# Patient Record
Sex: FEMALE | Race: BLACK OR AFRICAN AMERICAN | NOT HISPANIC OR LATINO | URBAN - METROPOLITAN AREA
[De-identification: names, ages, dates, MRNs, and addresses within clinical notes are randomized per-mention and may not be internally consistent; named-entity substitution may affect disease eponyms.]

---
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
## 2023-04-20 ENCOUNTER — LAB VISIT (OUTPATIENT)
Dept: LAB | Facility: HOSPITAL | Age: 18
End: 2023-04-20
Payer: COMMERCIAL

## 2023-04-20 ENCOUNTER — OFFICE VISIT (OUTPATIENT)
Dept: INTERNAL MEDICINE | Facility: CLINIC | Age: 18
End: 2023-04-20
Payer: COMMERCIAL

## 2023-04-20 VITALS
WEIGHT: 167.75 LBS | HEIGHT: 63 IN | TEMPERATURE: 98 F | SYSTOLIC BLOOD PRESSURE: 116 MMHG | BODY MASS INDEX: 29.72 KG/M2 | OXYGEN SATURATION: 97 % | DIASTOLIC BLOOD PRESSURE: 66 MMHG | HEART RATE: 84 BPM

## 2023-04-20 DIAGNOSIS — Z11.3 SCREENING FOR STD (SEXUALLY TRANSMITTED DISEASE): ICD-10-CM

## 2023-04-20 DIAGNOSIS — Z88.9 MULTIPLE ALLERGIES: ICD-10-CM

## 2023-04-20 DIAGNOSIS — R59.1 LYMPHADENOPATHY OF HEAD AND NECK: ICD-10-CM

## 2023-04-20 DIAGNOSIS — Z79.899 LONG TERM USE OF DRUG: Primary | ICD-10-CM

## 2023-04-20 PROCEDURE — 99204 OFFICE O/P NEW MOD 45 MIN: CPT | Mod: S$GLB,,, | Performed by: FAMILY MEDICINE

## 2023-04-20 PROCEDURE — 3074F PR MOST RECENT SYSTOLIC BLOOD PRESSURE < 130 MM HG: ICD-10-PCS | Mod: CPTII,S$GLB,, | Performed by: FAMILY MEDICINE

## 2023-04-20 PROCEDURE — 99204 PR OFFICE/OUTPT VISIT, NEW, LEVL IV, 45-59 MIN: ICD-10-PCS | Mod: S$GLB,,, | Performed by: FAMILY MEDICINE

## 2023-04-20 PROCEDURE — 99999 PR PBB SHADOW E&M-EST. PATIENT-LVL IV: ICD-10-PCS | Mod: PBBFAC,,, | Performed by: FAMILY MEDICINE

## 2023-04-20 PROCEDURE — 3008F BODY MASS INDEX DOCD: CPT | Mod: CPTII,S$GLB,, | Performed by: FAMILY MEDICINE

## 2023-04-20 PROCEDURE — 3078F PR MOST RECENT DIASTOLIC BLOOD PRESSURE < 80 MM HG: ICD-10-PCS | Mod: CPTII,S$GLB,, | Performed by: FAMILY MEDICINE

## 2023-04-20 PROCEDURE — 3008F PR BODY MASS INDEX (BMI) DOCUMENTED: ICD-10-PCS | Mod: CPTII,S$GLB,, | Performed by: FAMILY MEDICINE

## 2023-04-20 PROCEDURE — 1159F PR MEDICATION LIST DOCUMENTED IN MEDICAL RECORD: ICD-10-PCS | Mod: CPTII,S$GLB,, | Performed by: FAMILY MEDICINE

## 2023-04-20 PROCEDURE — 3044F HG A1C LEVEL LT 7.0%: CPT | Mod: CPTII,S$GLB,, | Performed by: FAMILY MEDICINE

## 2023-04-20 PROCEDURE — 3078F DIAST BP <80 MM HG: CPT | Mod: CPTII,S$GLB,, | Performed by: FAMILY MEDICINE

## 2023-04-20 PROCEDURE — 3044F PR MOST RECENT HEMOGLOBIN A1C LEVEL <7.0%: ICD-10-PCS | Mod: CPTII,S$GLB,, | Performed by: FAMILY MEDICINE

## 2023-04-20 PROCEDURE — 87591 N.GONORRHOEAE DNA AMP PROB: CPT | Performed by: FAMILY MEDICINE

## 2023-04-20 PROCEDURE — 1159F MED LIST DOCD IN RCRD: CPT | Mod: CPTII,S$GLB,, | Performed by: FAMILY MEDICINE

## 2023-04-20 PROCEDURE — 3074F SYST BP LT 130 MM HG: CPT | Mod: CPTII,S$GLB,, | Performed by: FAMILY MEDICINE

## 2023-04-20 PROCEDURE — 99999 PR PBB SHADOW E&M-EST. PATIENT-LVL IV: CPT | Mod: PBBFAC,,, | Performed by: FAMILY MEDICINE

## 2023-04-20 RX ORDER — EPINEPHRINE 0.3 MG/.3ML
2 INJECTION SUBCUTANEOUS ONCE
Qty: 0.6 ML | Refills: 0 | Status: SHIPPED | OUTPATIENT
Start: 2023-04-20 | End: 2023-05-04

## 2023-04-20 NOTE — PROGRESS NOTES
Subjective:      Patient ID: Anita Romero is a 17 y.o. female.    Chief Complaint: Establish Care    Originally from [place]. Needs a local PCP.   [place]. Wants to do business. Wants to real estate.     Had rash on her ears and neck.  She was around a tree. She wants to be tested for allergies.    She often breaks out and doesn't know from what.   Will have throat swelling with shellfish.   Has to carry benadryl around with her.   Does not have any epi pen.     Noticed a swelling behind her ear. First noticed it yesterday.    No pain.   No change in ROM with swelling.     Interested in being screened for STDs.       The patient's Health Maintenance was reviewed and the following appears to be due at this time:   Health Maintenance Due   Topic Date Due    COVID-19 Vaccine (1) Never done    TETANUS VACCINE  Never done    Influenza Vaccine (1) Never done        Past Medical History:  History reviewed. No pertinent past medical history.  History reviewed. No pertinent surgical history.  Review of patient's allergies indicates:   Allergen Reactions    Shellfish containing products Swelling     Social History     Socioeconomic History    Marital status: Single   Occupational History    Occupation: student   Tobacco Use    Smoking status: Never    Smokeless tobacco: Never   Substance and Sexual Activity    Alcohol use: Never    Drug use: Never    Sexual activity: Yes     Partners: Male     Birth control/protection: OCP     Family History   Problem Relation Age of Onset    Hypertension Mother        Review of Systems   Constitutional:  Negative for activity change and unexpected weight change.   HENT:  Negative for hearing loss, rhinorrhea and trouble swallowing.    Eyes:  Negative for discharge and visual disturbance.   Respiratory:  Negative for chest tightness and wheezing.    Cardiovascular:  Negative for chest pain and palpitations.   Gastrointestinal:  Negative for blood in stool, constipation, diarrhea and  "vomiting.   Endocrine: Negative for polydipsia and polyuria.   Genitourinary:  Negative for difficulty urinating, dysuria, hematuria and menstrual problem.   Musculoskeletal:  Negative for arthralgias, joint swelling and neck pain.   Neurological:  Negative for weakness and headaches.   Psychiatric/Behavioral:  Negative for confusion and dysphoric mood.       Objective:   /66 (BP Location: Left arm, Patient Position: Sitting, BP Method: Medium (Manual))   Pulse 84   Temp 97.6 °F (36.4 °C) (Tympanic)   Ht 5' 2.6" (1.59 m)   Wt 76.1 kg (167 lb 12.3 oz)   SpO2 97%   BMI 30.10 kg/m²     Physical Exam  Vitals and nursing note reviewed.   Constitutional:       Appearance: Normal appearance.   HENT:      Head: Normocephalic.   Eyes:      Conjunctiva/sclera: Conjunctivae normal.   Cardiovascular:      Rate and Rhythm: Normal rate and regular rhythm.   Pulmonary:      Effort: Pulmonary effort is normal.      Breath sounds: Normal breath sounds.   Musculoskeletal:      Right lower leg: No edema.      Left lower leg: No edema.   Lymphadenopathy:      Cervical: Cervical adenopathy (Postauricular lymphadenopathy on left side) present.   Skin:     General: Skin is warm and dry.      Findings: No rash.   Neurological:      Mental Status: She is alert and oriented to person, place, and time. Mental status is at baseline.   Psychiatric:         Mood and Affect: Mood normal.         Behavior: Behavior normal.         Thought Content: Thought content normal.       1. Long term use of drug    2. Multiple allergies    3. Screening for STD (sexually transmitted disease)    4. Lymphadenopathy of head and neck      Plan:       1. Long term use of drug  -     CBC Auto Differential; Future; Expected date: 04/20/2023  -     Comprehensive Metabolic Panel; Future; Expected date: 04/20/2023  -     TSH; Future; Expected date: 04/20/2023  -     T4, Free; Future; Expected date: 04/20/2023  -     Hemoglobin A1C; Future; Expected date: " 04/20/2023  -     Lipid Panel; Future; Expected date: 04/20/2023    2. Multiple allergies  Comments:  Referral to Allergy for testing; recommended patient carrying EpiPen and avoid seafood until she establishes with allergy  Orders:  -     Ambulatory referral/consult to Allergy; Future; Expected date: 04/27/2023  -     EPINEPHrine (EPIPEN) 0.3 mg/0.3 mL AtIn; Inject 0.6 mLs (0.6 mg total) into the muscle once. for 1 dose  Dispense: 0.6 mL; Refill: 0    3. Screening for STD (sexually transmitted disease)  Comments:  Checking labs, we will follow up with the results  Orders:  -     Hepatitis B Surface Ab, Qualitative; Future; Expected date: 04/20/2023  -     Hepatitis B Surface Antigen; Future; Expected date: 04/20/2023  -     Hepatitis C Antibody; Future; Expected date: 04/20/2023  -     HIV 1/2 Ag/Ab (4th Gen); Future; Expected date: 04/20/2023  -     RPR; Future; Expected date: 04/20/2023  -     C. trachomatis/N. gonorrhoeae by AMP DNA [facility]; Urine; Future; Expected date: 04/20/2023  -     Trichomonas vaginalis, RNA, Qual, Urine    4. Lymphadenopathy of head and neck  Comments:  Will have patient follow up in 2 weeks, if no improvement will consider doing ultrasound         Medication List with Changes/Refills   New Medications    EPINEPHRINE (EPIPEN) 0.3 MG/0.3 ML ATIN    Inject 0.6 mLs (0.6 mg total) into the muscle once. for 1 dose   Current Medications    TRI-LO-SPRINTEC 0.18/0.215/0.25 MG-25 MCG TABLET    Take 1 tablet by mouth once daily.                Follow up in about 2 weeks (around 5/4/2023).

## 2023-04-22 LAB
C TRACH DNA SPEC QL NAA+PROBE: NOT DETECTED
N GONORRHOEA DNA SPEC QL NAA+PROBE: NOT DETECTED

## 2023-04-24 ENCOUNTER — PATIENT MESSAGE (OUTPATIENT)
Dept: INTERNAL MEDICINE | Facility: CLINIC | Age: 18
End: 2023-04-24
Payer: COMMERCIAL

## 2023-04-25 ENCOUNTER — TELEPHONE (OUTPATIENT)
Dept: INTERNAL MEDICINE | Facility: CLINIC | Age: 18
End: 2023-04-25
Payer: COMMERCIAL

## 2023-04-25 NOTE — TELEPHONE ENCOUNTER
Returned pt call. Let pt know that I was returning call from  office, pt then hung up the phone mid conversation. Called pt again to see what happened, pt didn't answer the phone. //DM

## 2023-04-26 ENCOUNTER — PATIENT MESSAGE (OUTPATIENT)
Dept: INTERNAL MEDICINE | Facility: CLINIC | Age: 18
End: 2023-04-26
Payer: COMMERCIAL

## 2023-04-26 ENCOUNTER — TELEPHONE (OUTPATIENT)
Dept: INTERNAL MEDICINE | Facility: CLINIC | Age: 18
End: 2023-04-26
Payer: COMMERCIAL

## 2023-04-26 ENCOUNTER — TELEPHONE (OUTPATIENT)
Dept: PRIMARY CARE CLINIC | Facility: CLINIC | Age: 18
End: 2023-04-26
Payer: COMMERCIAL

## 2023-04-26 NOTE — TELEPHONE ENCOUNTER
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 PT INFORMED TO KEEP HER UP COMING APPT ON MAY 4,23. MARLYS

## 2023-04-26 NOTE — TELEPHONE ENCOUNTER
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

## 2023-05-04 ENCOUNTER — OFFICE VISIT (OUTPATIENT)
Dept: INTERNAL MEDICINE | Facility: CLINIC | Age: 18
End: 2023-05-04
Payer: COMMERCIAL

## 2023-05-04 ENCOUNTER — LAB VISIT (OUTPATIENT)
Dept: LAB | Facility: HOSPITAL | Age: 18
End: 2023-05-04
Attending: FAMILY MEDICINE
Payer: COMMERCIAL

## 2023-05-04 VITALS
HEIGHT: 63 IN | TEMPERATURE: 97 F | OXYGEN SATURATION: 98 % | WEIGHT: 164.88 LBS | SYSTOLIC BLOOD PRESSURE: 116 MMHG | HEART RATE: 78 BPM | DIASTOLIC BLOOD PRESSURE: 70 MMHG | BODY MASS INDEX: 29.21 KG/M2

## 2023-05-04 DIAGNOSIS — R76.8 BIOLOGICAL FALSE POSITIVE RPR TEST: Primary | ICD-10-CM

## 2023-05-04 DIAGNOSIS — R79.9 ABNORMAL BLOOD CELL COUNT: ICD-10-CM

## 2023-05-04 DIAGNOSIS — R76.8 BIOLOGICAL FALSE POSITIVE RPR TEST: ICD-10-CM

## 2023-05-04 DIAGNOSIS — Z23 ENCOUNTER FOR IMMUNIZATION: ICD-10-CM

## 2023-05-04 LAB
BASOPHILS # BLD AUTO: 0.05 K/UL (ref 0–0.2)
BASOPHILS NFR BLD: 0.7 % (ref 0–1.9)
DIFFERENTIAL METHOD: ABNORMAL
EOSINOPHIL # BLD AUTO: 0.1 K/UL (ref 0–0.5)
EOSINOPHIL NFR BLD: 1.6 % (ref 0–8)
ERYTHROCYTE [DISTWIDTH] IN BLOOD BY AUTOMATED COUNT: 11.3 % (ref 11.5–14.5)
HCT VFR BLD AUTO: 39.7 % (ref 37–48.5)
HGB BLD-MCNC: 12.8 G/DL (ref 12–16)
IMM GRANULOCYTES # BLD AUTO: 0.01 K/UL (ref 0–0.04)
IMM GRANULOCYTES NFR BLD AUTO: 0.1 % (ref 0–0.5)
LYMPHOCYTES # BLD AUTO: 2.4 K/UL (ref 1–4.8)
LYMPHOCYTES NFR BLD: 33.9 % (ref 18–48)
MCH RBC QN AUTO: 30.5 PG (ref 27–31)
MCHC RBC AUTO-ENTMCNC: 32.2 G/DL (ref 32–36)
MCV RBC AUTO: 95 FL (ref 82–98)
MONOCYTES # BLD AUTO: 0.6 K/UL (ref 0.3–1)
MONOCYTES NFR BLD: 8.3 % (ref 4–15)
NEUTROPHILS # BLD AUTO: 3.9 K/UL (ref 1.8–7.7)
NEUTROPHILS NFR BLD: 55.4 % (ref 38–73)
NRBC BLD-RTO: 0 /100 WBC
PLATELET # BLD AUTO: 301 K/UL (ref 150–450)
PMV BLD AUTO: 11.2 FL (ref 9.2–12.9)
RBC # BLD AUTO: 4.2 M/UL (ref 4–5.4)
WBC # BLD AUTO: 6.97 K/UL (ref 3.9–12.7)

## 2023-05-04 PROCEDURE — 1159F MED LIST DOCD IN RCRD: CPT | Mod: CPTII,S$GLB,, | Performed by: FAMILY MEDICINE

## 2023-05-04 PROCEDURE — 86780 TREPONEMA PALLIDUM: CPT | Performed by: FAMILY MEDICINE

## 2023-05-04 PROCEDURE — 3044F PR MOST RECENT HEMOGLOBIN A1C LEVEL <7.0%: ICD-10-PCS | Mod: CPTII,S$GLB,, | Performed by: FAMILY MEDICINE

## 2023-05-04 PROCEDURE — 99214 PR OFFICE/OUTPT VISIT, EST, LEVL IV, 30-39 MIN: ICD-10-PCS | Mod: 25,S$GLB,, | Performed by: FAMILY MEDICINE

## 2023-05-04 PROCEDURE — 36415 COLL VENOUS BLD VENIPUNCTURE: CPT | Performed by: FAMILY MEDICINE

## 2023-05-04 PROCEDURE — 85025 COMPLETE CBC W/AUTO DIFF WBC: CPT | Performed by: FAMILY MEDICINE

## 2023-05-04 PROCEDURE — 90739 HEPB VACC 2/4 DOSE ADULT IM: CPT | Mod: S$GLB,,, | Performed by: FAMILY MEDICINE

## 2023-05-04 PROCEDURE — 86592 SYPHILIS TEST NON-TREP QUAL: CPT | Performed by: FAMILY MEDICINE

## 2023-05-04 PROCEDURE — 86593 SYPHILIS TEST NON-TREP QUANT: CPT | Performed by: FAMILY MEDICINE

## 2023-05-04 PROCEDURE — 3078F PR MOST RECENT DIASTOLIC BLOOD PRESSURE < 80 MM HG: ICD-10-PCS | Mod: CPTII,S$GLB,, | Performed by: FAMILY MEDICINE

## 2023-05-04 PROCEDURE — 86038 ANTINUCLEAR ANTIBODIES: CPT | Performed by: FAMILY MEDICINE

## 2023-05-04 PROCEDURE — 3074F SYST BP LT 130 MM HG: CPT | Mod: CPTII,S$GLB,, | Performed by: FAMILY MEDICINE

## 2023-05-04 PROCEDURE — 90471 IMMUNIZATION ADMIN: CPT | Mod: S$GLB,,, | Performed by: FAMILY MEDICINE

## 2023-05-04 PROCEDURE — 3078F DIAST BP <80 MM HG: CPT | Mod: CPTII,S$GLB,, | Performed by: FAMILY MEDICINE

## 2023-05-04 PROCEDURE — 90471 HEPATITIS B (RECOMBINANT) ADJUVANTED, 2 DOSE: ICD-10-PCS | Mod: S$GLB,,, | Performed by: FAMILY MEDICINE

## 2023-05-04 PROCEDURE — 90739 HEPATITIS B (RECOMBINANT) ADJUVANTED, 2 DOSE: ICD-10-PCS | Mod: S$GLB,,, | Performed by: FAMILY MEDICINE

## 2023-05-04 PROCEDURE — 1159F PR MEDICATION LIST DOCUMENTED IN MEDICAL RECORD: ICD-10-PCS | Mod: CPTII,S$GLB,, | Performed by: FAMILY MEDICINE

## 2023-05-04 PROCEDURE — 99214 OFFICE O/P EST MOD 30 MIN: CPT | Mod: 25,S$GLB,, | Performed by: FAMILY MEDICINE

## 2023-05-04 PROCEDURE — 99999 PR PBB SHADOW E&M-EST. PATIENT-LVL III: ICD-10-PCS | Mod: PBBFAC,,, | Performed by: FAMILY MEDICINE

## 2023-05-04 PROCEDURE — 99999 PR PBB SHADOW E&M-EST. PATIENT-LVL III: CPT | Mod: PBBFAC,,, | Performed by: FAMILY MEDICINE

## 2023-05-04 PROCEDURE — 3074F PR MOST RECENT SYSTOLIC BLOOD PRESSURE < 130 MM HG: ICD-10-PCS | Mod: CPTII,S$GLB,, | Performed by: FAMILY MEDICINE

## 2023-05-04 PROCEDURE — 3008F PR BODY MASS INDEX (BMI) DOCUMENTED: ICD-10-PCS | Mod: CPTII,S$GLB,, | Performed by: FAMILY MEDICINE

## 2023-05-04 PROCEDURE — 3044F HG A1C LEVEL LT 7.0%: CPT | Mod: CPTII,S$GLB,, | Performed by: FAMILY MEDICINE

## 2023-05-04 PROCEDURE — 3008F BODY MASS INDEX DOCD: CPT | Mod: CPTII,S$GLB,, | Performed by: FAMILY MEDICINE

## 2023-05-04 NOTE — PROGRESS NOTES
"Subjective:      Patient ID: Anita Romero is a 17 y.o. female.    Chief Complaint: Follow-up    Accompanied by her mother.     Swelling has gone down on LN underneath her ear. No pain.     False positive RPR.  Her antibodies were negative.  Cousin has lupus.   Has occasional flare of joint pain.   Had negative HIV test at the last visit.     The patient's Health Maintenance was reviewed and the following appears to be due at this time:   Health Maintenance Due   Topic Date Due    COVID-19 Vaccine (1) Never done    TETANUS VACCINE  Never done        Past Medical History:  History reviewed. No pertinent past medical history.  History reviewed. No pertinent surgical history.  Review of patient's allergies indicates:   Allergen Reactions    Shellfish containing products Swelling     Social History     Socioeconomic History    Marital status: Single   Occupational History    Occupation: student   Tobacco Use    Smoking status: Never    Smokeless tobacco: Never   Substance and Sexual Activity    Alcohol use: Never    Drug use: Never    Sexual activity: Yes     Partners: Male     Birth control/protection: OCP     Family History   Problem Relation Age of Onset    Hypertension Mother        Review of Systems   Constitutional:  Negative for fever.   Respiratory:  Negative for cough and shortness of breath.    Cardiovascular:  Negative for chest pain.   Gastrointestinal:  Negative for diarrhea, nausea and vomiting.   Psychiatric/Behavioral:  Negative for dysphoric mood and sleep disturbance. The patient is not nervous/anxious.       Objective:   /70 (BP Location: Left arm, Patient Position: Sitting, BP Method: Medium (Manual))   Pulse 78   Temp 96.7 °F (35.9 °C) (Tympanic)   Ht 5' 2.6" (1.59 m)   Wt 74.8 kg (164 lb 14.5 oz)   SpO2 98%   BMI 29.59 kg/m²     Physical Exam  Vitals and nursing note reviewed.   Constitutional:       Appearance: Normal appearance.   HENT:      Head: Normocephalic.   Eyes:      " Conjunctiva/sclera: Conjunctivae normal.   Cardiovascular:      Rate and Rhythm: Normal rate and regular rhythm.   Pulmonary:      Effort: Pulmonary effort is normal.      Breath sounds: Normal breath sounds.   Musculoskeletal:      Right lower leg: No edema.      Left lower leg: No edema.   Lymphadenopathy:      Cervical: No cervical adenopathy (no palpable LN).   Skin:     General: Skin is warm and dry.      Findings: No rash.   Neurological:      Mental Status: She is alert and oriented to person, place, and time. Mental status is at baseline.   Psychiatric:         Mood and Affect: Mood normal.         Behavior: Behavior normal.         Thought Content: Thought content normal.       1. Biological false positive RPR test    2. Encounter for immunization    3. Abnormal blood cell count      Plan:       1. Biological false positive RPR test  Comments:  checking for potential etiologies; treponeal antibodies were negative  Orders:  -     JACOBY; Future; Expected date: 05/04/2023  -     Pregnancy, urine rapid  -     RPR; Future; Expected date: 05/04/2023    2. Encounter for immunization  -     (In Office Administered) Hepatitis B (Recombinant) Adjuvanted, 2 dose    3. Abnormal blood cell count  Comments:  rechecking value  Orders:  -     CBC Auto Differential; Future; Expected date: 05/04/2023         Medication List with Changes/Refills   Current Medications    EPINEPHRINE (EPIPEN) 0.3 MG/0.3 ML ATIN    Inject 0.6 mLs (0.6 mg total) into the muscle once. for 1 dose    TRI-LO-SPRINTEC 0.18/0.215/0.25 MG-25 MCG TABLET    Take 1 tablet by mouth once daily.                No follow-ups on file.

## 2023-05-05 LAB
ANA SER QL IF: NORMAL
RPR SER QL: REACTIVE
RPR SER-TITR: ABNORMAL {TITER}

## 2023-05-08 ENCOUNTER — PATIENT MESSAGE (OUTPATIENT)
Dept: INTERNAL MEDICINE | Facility: CLINIC | Age: 18
End: 2023-05-08
Payer: COMMERCIAL

## 2023-05-08 LAB — T PALLIDUM AB SER QL IF: NORMAL

## 2023-07-20 ENCOUNTER — TELEPHONE (OUTPATIENT)
Dept: ALLERGY | Facility: CLINIC | Age: 18
End: 2023-07-20
Payer: COMMERCIAL

## 2023-07-20 NOTE — TELEPHONE ENCOUNTER
----- Message from Inga Snyder sent at 7/20/2023  8:55 AM CDT -----  Contact: Meche/Mother  Patient's mother is calling to speak with a nurse regarding appt. Reports wanting to be seen sooner than 9/18 if possible . Please give a call back at [phone] .

## 2023-09-18 ENCOUNTER — LAB VISIT (OUTPATIENT)
Dept: LAB | Facility: HOSPITAL | Age: 18
End: 2023-09-18
Attending: ALLERGY & IMMUNOLOGY
Payer: COMMERCIAL

## 2023-09-18 ENCOUNTER — OFFICE VISIT (OUTPATIENT)
Dept: ALLERGY | Facility: CLINIC | Age: 18
End: 2023-09-18
Payer: COMMERCIAL

## 2023-09-18 VITALS
WEIGHT: 168.44 LBS | HEART RATE: 82 BPM | DIASTOLIC BLOOD PRESSURE: 70 MMHG | SYSTOLIC BLOOD PRESSURE: 115 MMHG | BODY MASS INDEX: 31 KG/M2 | HEIGHT: 62 IN | TEMPERATURE: 99 F

## 2023-09-18 DIAGNOSIS — Z88.9 MULTIPLE ALLERGIES: ICD-10-CM

## 2023-09-18 DIAGNOSIS — Z91.018 FOOD ALLERGY: ICD-10-CM

## 2023-09-18 DIAGNOSIS — Z91.018 FOOD ALLERGY: Primary | ICD-10-CM

## 2023-09-18 DIAGNOSIS — L50.1 CHRONIC IDIOPATHIC URTICARIA: ICD-10-CM

## 2023-09-18 LAB
ALBUMIN SERPL BCP-MCNC: 4 G/DL (ref 3.5–5.2)
ALP SERPL-CCNC: 72 U/L (ref 55–135)
ALT SERPL W/O P-5'-P-CCNC: 9 U/L (ref 10–44)
ANION GAP SERPL CALC-SCNC: 7 MMOL/L (ref 8–16)
AST SERPL-CCNC: 15 U/L (ref 10–40)
BASOPHILS # BLD AUTO: 0.02 K/UL (ref 0–0.2)
BASOPHILS NFR BLD: 0.2 % (ref 0–1.9)
BILIRUB SERPL-MCNC: 0.3 MG/DL (ref 0.1–1)
BUN SERPL-MCNC: 11 MG/DL (ref 6–20)
CALCIUM SERPL-MCNC: 9.2 MG/DL (ref 8.7–10.5)
CHLORIDE SERPL-SCNC: 105 MMOL/L (ref 95–110)
CO2 SERPL-SCNC: 23 MMOL/L (ref 23–29)
CREAT SERPL-MCNC: 0.9 MG/DL (ref 0.5–1.4)
DIFFERENTIAL METHOD: NORMAL
EOSINOPHIL # BLD AUTO: 0.1 K/UL (ref 0–0.5)
EOSINOPHIL NFR BLD: 1 % (ref 0–8)
ERYTHROCYTE [DISTWIDTH] IN BLOOD BY AUTOMATED COUNT: 11.5 % (ref 11.5–14.5)
EST. GFR  (NO RACE VARIABLE): >60 ML/MIN/1.73 M^2
GLUCOSE SERPL-MCNC: 73 MG/DL (ref 70–110)
HBV CORE IGM SERPL QL IA: NORMAL
HCT VFR BLD AUTO: 39.9 % (ref 37–48.5)
HCV AB SERPL QL IA: NORMAL
HGB BLD-MCNC: 12.8 G/DL (ref 12–16)
HIV 1+2 AB+HIV1 P24 AG SERPL QL IA: NORMAL
IGE SERPL-ACNC: 151 IU/ML (ref 0–100)
IMM GRANULOCYTES # BLD AUTO: 0.03 K/UL (ref 0–0.04)
IMM GRANULOCYTES NFR BLD AUTO: 0.3 % (ref 0–0.5)
LYMPHOCYTES # BLD AUTO: 2.5 K/UL (ref 1–4.8)
LYMPHOCYTES NFR BLD: 23.4 % (ref 18–48)
MCH RBC QN AUTO: 30.3 PG (ref 27–31)
MCHC RBC AUTO-ENTMCNC: 32.1 G/DL (ref 32–36)
MCV RBC AUTO: 94 FL (ref 82–98)
MONOCYTES # BLD AUTO: 0.8 K/UL (ref 0.3–1)
MONOCYTES NFR BLD: 7.2 % (ref 4–15)
NEUTROPHILS # BLD AUTO: 7.3 K/UL (ref 1.8–7.7)
NEUTROPHILS NFR BLD: 67.9 % (ref 38–73)
NRBC BLD-RTO: 0 /100 WBC
PLATELET # BLD AUTO: 320 K/UL (ref 150–450)
PMV BLD AUTO: 11.4 FL (ref 9.2–12.9)
POTASSIUM SERPL-SCNC: 3.5 MMOL/L (ref 3.5–5.1)
PROT SERPL-MCNC: 7.5 G/DL (ref 6–8.4)
RBC # BLD AUTO: 4.23 M/UL (ref 4–5.4)
SODIUM SERPL-SCNC: 135 MMOL/L (ref 136–145)
WBC # BLD AUTO: 10.79 K/UL (ref 3.9–12.7)

## 2023-09-18 PROCEDURE — 84165 PROTEIN E-PHORESIS SERUM: CPT | Mod: 26,,, | Performed by: PATHOLOGY

## 2023-09-18 PROCEDURE — 86161 COMPLEMENT/FUNCTION ACTIVITY: CPT | Performed by: ALLERGY & IMMUNOLOGY

## 2023-09-18 PROCEDURE — 87389 HIV-1 AG W/HIV-1&-2 AB AG IA: CPT | Performed by: ALLERGY & IMMUNOLOGY

## 2023-09-18 PROCEDURE — 86592 SYPHILIS TEST NON-TREP QUAL: CPT | Performed by: ALLERGY & IMMUNOLOGY

## 2023-09-18 PROCEDURE — 83520 IMMUNOASSAY QUANT NOS NONAB: CPT | Performed by: ALLERGY & IMMUNOLOGY

## 2023-09-18 PROCEDURE — 1159F PR MEDICATION LIST DOCUMENTED IN MEDICAL RECORD: ICD-10-PCS | Mod: CPTII,S$GLB,, | Performed by: ALLERGY & IMMUNOLOGY

## 2023-09-18 PROCEDURE — 1159F MED LIST DOCD IN RCRD: CPT | Mod: CPTII,S$GLB,, | Performed by: ALLERGY & IMMUNOLOGY

## 2023-09-18 PROCEDURE — 3078F DIAST BP <80 MM HG: CPT | Mod: CPTII,S$GLB,, | Performed by: ALLERGY & IMMUNOLOGY

## 2023-09-18 PROCEDURE — 86038 ANTINUCLEAR ANTIBODIES: CPT | Performed by: ALLERGY & IMMUNOLOGY

## 2023-09-18 PROCEDURE — 3074F PR MOST RECENT SYSTOLIC BLOOD PRESSURE < 130 MM HG: ICD-10-PCS | Mod: CPTII,S$GLB,, | Performed by: ALLERGY & IMMUNOLOGY

## 2023-09-18 PROCEDURE — 86003 ALLG SPEC IGE CRUDE XTRC EA: CPT | Performed by: ALLERGY & IMMUNOLOGY

## 2023-09-18 PROCEDURE — 80053 COMPREHEN METABOLIC PANEL: CPT | Performed by: ALLERGY & IMMUNOLOGY

## 2023-09-18 PROCEDURE — 86003 ALLG SPEC IGE CRUDE XTRC EA: CPT | Mod: 59 | Performed by: ALLERGY & IMMUNOLOGY

## 2023-09-18 PROCEDURE — 3044F PR MOST RECENT HEMOGLOBIN A1C LEVEL <7.0%: ICD-10-PCS | Mod: CPTII,S$GLB,, | Performed by: ALLERGY & IMMUNOLOGY

## 2023-09-18 PROCEDURE — 86803 HEPATITIS C AB TEST: CPT | Performed by: ALLERGY & IMMUNOLOGY

## 2023-09-18 PROCEDURE — 3008F BODY MASS INDEX DOCD: CPT | Mod: CPTII,S$GLB,, | Performed by: ALLERGY & IMMUNOLOGY

## 2023-09-18 PROCEDURE — 82785 ASSAY OF IGE: CPT | Performed by: ALLERGY & IMMUNOLOGY

## 2023-09-18 PROCEDURE — 99204 PR OFFICE/OUTPT VISIT, NEW, LEVL IV, 45-59 MIN: ICD-10-PCS | Mod: S$GLB,,, | Performed by: ALLERGY & IMMUNOLOGY

## 2023-09-18 PROCEDURE — 3074F SYST BP LT 130 MM HG: CPT | Mod: CPTII,S$GLB,, | Performed by: ALLERGY & IMMUNOLOGY

## 2023-09-18 PROCEDURE — 84165 PATHOLOGIST INTERPRETATION SPE: ICD-10-PCS | Mod: 26,,, | Performed by: PATHOLOGY

## 2023-09-18 PROCEDURE — 3078F PR MOST RECENT DIASTOLIC BLOOD PRESSURE < 80 MM HG: ICD-10-PCS | Mod: CPTII,S$GLB,, | Performed by: ALLERGY & IMMUNOLOGY

## 2023-09-18 PROCEDURE — 84443 ASSAY THYROID STIM HORMONE: CPT | Performed by: ALLERGY & IMMUNOLOGY

## 2023-09-18 PROCEDURE — 3008F PR BODY MASS INDEX (BMI) DOCUMENTED: ICD-10-PCS | Mod: CPTII,S$GLB,, | Performed by: ALLERGY & IMMUNOLOGY

## 2023-09-18 PROCEDURE — 99999 PR PBB SHADOW E&M-EST. PATIENT-LVL IV: ICD-10-PCS | Mod: PBBFAC,,, | Performed by: ALLERGY & IMMUNOLOGY

## 2023-09-18 PROCEDURE — 3044F HG A1C LEVEL LT 7.0%: CPT | Mod: CPTII,S$GLB,, | Performed by: ALLERGY & IMMUNOLOGY

## 2023-09-18 PROCEDURE — 86343 LEUKOCYTE HISTAMINE RELEASE: CPT | Performed by: ALLERGY & IMMUNOLOGY

## 2023-09-18 PROCEDURE — 99999 PR PBB SHADOW E&M-EST. PATIENT-LVL IV: CPT | Mod: PBBFAC,,, | Performed by: ALLERGY & IMMUNOLOGY

## 2023-09-18 PROCEDURE — 99204 OFFICE O/P NEW MOD 45 MIN: CPT | Mod: S$GLB,,, | Performed by: ALLERGY & IMMUNOLOGY

## 2023-09-18 PROCEDURE — 86160 COMPLEMENT ANTIGEN: CPT | Performed by: ALLERGY & IMMUNOLOGY

## 2023-09-18 PROCEDURE — 84165 PROTEIN E-PHORESIS SERUM: CPT | Performed by: ALLERGY & IMMUNOLOGY

## 2023-09-18 PROCEDURE — 85025 COMPLETE CBC W/AUTO DIFF WBC: CPT | Performed by: ALLERGY & IMMUNOLOGY

## 2023-09-18 PROCEDURE — 36415 COLL VENOUS BLD VENIPUNCTURE: CPT | Performed by: ALLERGY & IMMUNOLOGY

## 2023-09-18 PROCEDURE — 86705 HEP B CORE ANTIBODY IGM: CPT | Performed by: ALLERGY & IMMUNOLOGY

## 2023-09-18 PROCEDURE — 86682 HELMINTH ANTIBODY: CPT | Performed by: ALLERGY & IMMUNOLOGY

## 2023-09-18 PROCEDURE — 86682 HELMINTH ANTIBODY: CPT | Mod: 91 | Performed by: ALLERGY & IMMUNOLOGY

## 2023-09-18 RX ORDER — CETIRIZINE HYDROCHLORIDE 10 MG/1
10 TABLET ORAL DAILY
Qty: 30 TABLET | Refills: 5 | Status: SHIPPED | OUTPATIENT
Start: 2023-09-18 | End: 2023-11-16

## 2023-09-18 RX ORDER — FAMOTIDINE 40 MG/1
40 TABLET, FILM COATED ORAL DAILY
Qty: 30 TABLET | Refills: 11 | Status: SHIPPED | OUTPATIENT
Start: 2023-09-18 | End: 2023-11-16

## 2023-09-18 NOTE — PROGRESS NOTES
"Subjective:      Patient ID: Anita Romero is a 18 y.o. female.    Chief Complaint:  Allergic Rhinitis       HPI:  18 year old female- "My Doctor recommended that I get allergy testing."  Shellfish- anaphylaxis- throat swelling and lips  She avoids fish as well, as she ate gumbo and is unsure, if she had symptoms related to fish or shellfish.      Hives-intermittently for the last 2 years, hives once or twice month    She suspects certain trees cause rashes.    She works at a .  She denies tongue or throat swelling when not in contact with shellfish.  She avoids shellfish.  She denies drug allergy.  She denies insect allergy.  She denies taking herbal medications  Benadryl earlier today    Past Medical History:  See above    Family history:  No family history of autoimmune disease.     Current Outpatient Medications on File Prior to Visit   Medication Sig Dispense Refill    TRI-LO-SPRINTEC 0.18/0.215/0.25 mg-25 mcg tablet Take 1 tablet by mouth once daily.      EPINEPHrine (EPIPEN) 0.3 mg/0.3 mL AtIn Inject 0.6 mLs (0.6 mg total) into the muscle once. for 1 dose 0.6 mL 0     No current facility-administered medications on file prior to visit.        Review of patient's allergies indicates:   Allergen Reactions    Shellfish containing products Swelling          Environmental History: Pets in the home: dogs (1).  Tobacco Smoke in Home: no  Review of Systems   Constitutional:  Negative for chills and fever.   HENT:  Negative for congestion and rhinorrhea.    Eyes:  Negative for discharge and itching.   Respiratory:  Negative for cough, shortness of breath and wheezing.    Cardiovascular:  Negative for chest pain and leg swelling.   Gastrointestinal:  Negative for nausea and vomiting.   Skin:  Positive for rash. Negative for wound.   Allergic/Immunologic: Positive for food allergies. Negative for environmental allergies and immunocompromised state.   Neurological:  Negative for facial asymmetry and speech " difficulty.   Psychiatric/Behavioral:  Negative for behavioral problems and suicidal ideas.        Objective:   Physical Exam  Vitals reviewed.   Constitutional:       General: She is not in acute distress.     Appearance: Normal appearance. She is well-developed. She is not ill-appearing, toxic-appearing or diaphoretic.   HENT:      Head: Normocephalic and atraumatic.      Right Ear: Tympanic membrane, ear canal and external ear normal. There is no impacted cerumen.      Left Ear: Tympanic membrane, ear canal and external ear normal. There is no impacted cerumen.      Nose: Nose normal. No congestion or rhinorrhea.      Mouth/Throat:      Pharynx: No oropharyngeal exudate or posterior oropharyngeal erythema.   Eyes:      General: No scleral icterus.        Right eye: No discharge.         Left eye: No discharge.      Pupils: Pupils are equal, round, and reactive to light.   Neck:      Thyroid: No thyromegaly.   Cardiovascular:      Rate and Rhythm: Normal rate and regular rhythm.      Heart sounds: Normal heart sounds. No murmur heard.     No friction rub. No gallop.   Pulmonary:      Effort: Pulmonary effort is normal. No respiratory distress.      Breath sounds: Normal breath sounds. No stridor. No wheezing, rhonchi or rales.   Chest:      Chest wall: No tenderness.   Musculoskeletal:         General: No swelling, tenderness, deformity or signs of injury. Normal range of motion.      Cervical back: Normal range of motion and neck supple. No rigidity or tenderness. No muscular tenderness.      Right lower leg: No edema.      Left lower leg: No edema.   Lymphadenopathy:      Cervical: No cervical adenopathy.   Skin:     General: Skin is warm.      Coloration: Skin is not jaundiced or pale.      Findings: No bruising, erythema or rash.   Neurological:      General: No focal deficit present.      Mental Status: She is alert and oriented to person, place, and time.      Gait: Gait normal.   Psychiatric:         Mood  and Affect: Mood normal.         Behavior: Behavior normal.         Thought Content: Thought content normal.         Judgment: Judgment normal.           Assessment:      1. Food allergy    2. Multiple allergies    3. Chronic idiopathic urticaria        Plan:     Food allergy  -     Shrimp IgE; Future; Expected date: 09/18/2023  -     Crab IgE; Future; Expected date: 09/18/2023  -     Lobster IgE; Future; Expected date: 09/18/2023  -     Clams IgE; Future; Expected date: 09/18/2023  -     Oyster IgE; Future; Expected date: 09/18/2023  -     Josephine IgE; Future; Expected date: 09/18/2023  -     Tuna IgE; Future; Expected date: 09/18/2023  -     ALLERGEN-CATFISH; Future; Expected date: 09/18/2023  -     Allergen-Codfish; Future; Expected date: 09/18/2023    Multiple allergies  Comments:  Referral to Allergy for testing; recommended patient carrying EpiPen and avoid seafood until she establishes with allergy  Orders:  -     Ambulatory referral/consult to Allergy    Chronic idiopathic urticaria  -     Allergen, Pecan Tree IgE; Future; Expected date: 09/18/2023  -     [place], black IgE; Future; Expected date: 09/18/2023  -     [place], bald IgE; Future; Expected date: 09/18/2023  -     Oak, white IgE; Future; Expected date: 09/18/2023  -     Allergen, Cocklebur; Future; Expected date: 09/18/2023  -     Cat epithelium IgE; Future; Expected date: 09/18/2023  -     RAST Allergen for [place]; Future; Expected date: 09/18/2023  -     RAST Allergen Maple ([place]); Future; Expected date: 09/18/2023  -     Allergen, Meadow Grass ([place]); Future; Expected date: 09/18/2023  -     Allergen-Silver Birch; Future; Expected date: 09/18/2023  -     RAST Allergen [place]; Future; Expected date: 09/18/2023  -     RAST Allergen, Sheep [place]); Future; Expected date: 09/18/2023  -     Allergen, Hackberry Celtis; Future; Expected date: 09/18/2023  -     Allergen, Elm Cedar; Future; Expected date: 09/18/2023  -      Allergen-[place]; Future; Expected date: 09/18/2023  -     Allergen-Alternaria Alternata; Future; Expected date: 09/18/2023  -     Allergen-Maple [place]; Future; Expected date: 09/18/2023  -     Allergen, White Ever; Future; Expected date: 09/18/2023  -     IgE; Future; Expected date: 09/18/2023  -     B[place]ia grass IgE; Future; Expected date: 09/18/2023  -     Aspergillus fumagatus IgE; Future; Expected date: 09/18/2023  -     Chaetomium globosum IgE; Future; Expected date: 09/18/2023  -     Cockroach, American IgE; Future; Expected date: 09/18/2023  -     Cladosporium IgE; Future; Expected date: 09/18/2023  -     Curvularia lunata IgE; Future; Expected date: 09/18/2023  -     D. farinae IgE; Future; Expected date: 09/18/2023  -     D. pteronyssinus IgE; Future; Expected date: 09/18/2023  -     Dog dander IgE; Future; Expected date: 09/18/2023  -     Plantain, English IgE; Future; Expected date: 09/18/2023  -     Eucalyptus IgE; Future; Expected date: 09/18/2023  -     Miranda elder, rough IgE; Future; Expected date: 09/18/2023  -     Mugwort IgE; Future; Expected date: 09/18/2023  -     Nettle IgE; Future; Expected date: 09/18/2023  -     Orchard grass IgE; Future; Expected date: 09/18/2023  -     [place], western white IgE; Future; Expected date: 09/18/2023  -     Privet, common IgE; Future; Expected date: 09/18/2023  -     Ragweed, short, common IgE; Future; Expected date: 09/18/2023  -     Red top grass IgE; Future; Expected date: 09/18/2023  -     Rye grass, cultivated IgE; Future; Expected date: 09/18/2023  -     Thistle, Russian IgE; Future; Expected date: 09/18/2023  -     Stemphyllium IgE; Future; Expected date: 09/18/2023  -     Apolinar IgE; Future; Expected date: 09/18/2023  -     Thor grass IgE; Future; Expected date: 09/18/2023  -     Strongyloides IgG Antibodies; Future; Expected date: 09/18/2023  -     Toxocara antibody; Future; Expected date: 09/18/2023  -     Tryptase; Future; Expected  date: 09/18/2023  -     CU (Chronic Urticaria) Index Panel; Future; Expected date: 09/18/2023  -     JACOBY Screen w/Reflex; Future; Expected date: 09/18/2023  -     CBC Auto Differential; Future; Expected date: 09/18/2023  -     Protein Electrophoresis, Serum; Future; Expected date: 09/18/2023  -     Ascaris IgE; Future; Expected date: 09/18/2023  -     RPR; Future; Expected date: 09/18/2023  -     Comprehensive Metabolic Panel; Future; Expected date: 09/18/2023  -     HIV 1/2 Ag/Ab (4th Gen); Future; Expected date: 09/18/2023  -     Hepatitis C Antibody; Future; Expected date: 09/18/2023  -     Hepatitis B Core Antibody, IgM; Future; Expected date: 09/18/2023  -     cetirizine (ZYRTEC) 10 MG tablet; Take 1 tablet (10 mg total) by mouth once daily.  Dispense: 30 tablet; Refill: 5  -     famotidine (PEPCID) 40 MG tablet; Take 1 tablet (40 mg total) by mouth once daily.  Dispense: 30 tablet; Refill: 11  -     C1 ESTERASE INHIBITOR PANEL; Future; Expected date: 09/18/2023  -     C1 ESTERASE INHIBITOR, FUNCTIONAL; Future; Expected date: 09/18/2023     Epipen 2 pack  Recommend strict avoidance of shellfish and fish.    RTC 4 weeks or sooner, if needed     GIA BAPTISTE                  Problems Address                                                 Amount and/or Complexity                                                                      Risk       3           [] 2 or more self-limited or minor problems                      [] Limited                                                                        [] Low                  [] 1 stable chronic illness                                                  Any combination of the two                                               OTC drugs                  []Acute, uncomplicated illness or injury                            Review of prior external notes from unique source           Minor surgery with no risk factors                                                                                                                [] 1 []2  []3+                                                                                                              Review of results from each unique test                                                                                                               [] 1 []2  [] 3+                                                                                                              Order of each unique test                                                                                                               [] 1 []2  [] 3+                                                                                                              Or                                                                                                             [] Assessment requiring an independent historian      4            [] One or more chronic illness with exacerbation,              [] Moderate                                                                      [x] Moderate                 Progression, or side effects of treatment                            -test documents or independent historians                        Prescription drug management                [x]  2 or more stable chronic illnesses                                    [] Independent interpretation of tests                              Minor surgery with identifiable risk                [] 1 undiagnosed new problem with uncertain prognosis    [x] Discussion or management of test results                    elective major surgery                [] 1 acute illness with                systemic symptoms                                                                                                                                                              [] 1 acute complicated injury                                                                                                                                           Elective major surgery                                                                                                                                                                                                                                                                                                                                                                                                  5            [] 1 or more chronic illnesses with severe exacerbation,     [] Extensive(two from below)                                         [] High                                                                                                               [] Independent interpretation of results                         Drug therapy requiring intensive                                                                                                               []Discussion of management or test interpretation           monitoring                                                                                                                                                                                                       Decision to de-escalate care                 [] 1 acute or chronic illness or injury that poses a threat                                                                                               Decision regarding hospitalization

## 2023-09-19 LAB
ALBUMIN SERPL ELPH-MCNC: 4.15 G/DL (ref 3.35–5.55)
ALPHA1 GLOB SERPL ELPH-MCNC: 0.29 G/DL (ref 0.17–0.41)
ALPHA2 GLOB SERPL ELPH-MCNC: 0.61 G/DL (ref 0.43–0.99)
ANA SER QL IF: NORMAL
B-GLOBULIN SERPL ELPH-MCNC: 0.77 G/DL (ref 0.5–1.1)
GAMMA GLOB SERPL ELPH-MCNC: 1.28 G/DL (ref 0.67–1.58)
PROT SERPL-MCNC: 7.1 G/DL (ref 6–8.4)
RPR SER QL: NORMAL

## 2023-09-20 LAB
AMER SYCAMORE IGE QN: <0.1 KU/L
PATHOLOGIST INTERPRETATION SPE: NORMAL
TRYPTASE LEVEL: 3.2 NG/ML

## 2023-09-21 LAB
A ALTERNATA IGE QN: <0.1 KU/L
A FUMIGATUS IGE QN: <0.1 KU/L
ALLERGEN BOXELDER MAPLE TREE IGE: <0.1 KU/L
ALLERGEN CHAETOMIUM GLOBOSUM IGE: <0.1 KU/L
ALLERGEN MAPLE (BOX ELDER) CLASS: NORMAL
ALLERGEN MULBERRY CLASS: NORMAL
ALLERGEN MULBERRY TREE IGE: <0.1 KU/L
ALLERGEN WHITE ASH TREE IGE: <0.1 KU/L
ALLERGEN WHITE PINE TREE IGE: <0.1 KU/L
BAHIA GRASS IGE QN: 4.97 KU/L
BALD CYPRESS IGE QN: <0.1 KU/L
C HERBARUM IGE QN: <0.1 KU/L
C LUNATA IGE QN: <0.1 KU/L
CAT DANDER IGE QN: 5.73 KU/L
CATFISH IGE QN: <0.1 KU/L
CHAETOMIUM GLOB. CLASS: NORMAL
CLAM IGE QN: 0.72 KU/L
COCKLEBUR IGE QN: <0.1 KU/L
COCKSFOOT IGE QN: 1.67 KU/L
CODFISH IGE QN: <0.1 KU/L
COMMON RAGWEED IGE QN: 0.51 KU/L
COTTONWOOD IGE QN: <0.1 KU/L
CRAB IGE QN: 2.39 KU/L
D FARINAE IGE QN: 22.6 KU/L
D PTERONYSS IGE QN: 20 KU/L
DEPRECATED A ALTERNATA IGE RAST QL: NORMAL
DEPRECATED A FUMIGATUS IGE RAST QL: NORMAL
DEPRECATED BAHIA GRASS IGE RAST QL: ABNORMAL
DEPRECATED BALD CYPRESS IGE RAST QL: NORMAL
DEPRECATED C HERBARUM IGE RAST QL: NORMAL
DEPRECATED C LUNATA IGE RAST QL: NORMAL
DEPRECATED CAT DANDER IGE RAST QL: ABNORMAL
DEPRECATED CATFISH IGE RAST QL: NORMAL
DEPRECATED CLAM IGE RAST QL: ABNORMAL
DEPRECATED COCKLEBUR IGE RAST QL: NORMAL
DEPRECATED COCKSFOOT IGE RAST QL: ABNORMAL
DEPRECATED CODFISH IGE RAST QL: NORMAL
DEPRECATED COMMON RAGWEED IGE RAST QL: ABNORMAL
DEPRECATED COTTONWOOD IGE RAST QL: NORMAL
DEPRECATED CRAB IGE RAST QL: ABNORMAL
DEPRECATED D FARINAE IGE RAST QL: ABNORMAL
DEPRECATED D PTERONYSS IGE RAST QL: ABNORMAL
DEPRECATED DOG DANDER IGE RAST QL: ABNORMAL
DEPRECATED ELDER IGE RAST QL: ABNORMAL
DEPRECATED ENGL PLANTAIN IGE RAST QL: NORMAL
DEPRECATED GUM-TREE IGE RAST QL: NORMAL
DEPRECATED HACKBERRY TREE IGE RAST QL: NORMAL
DEPRECATED JOHNSON GRASS IGE RAST QL: ABNORMAL
DEPRECATED KENT BLUE GRASS IGE RAST QL: ABNORMAL
DEPRECATED LOBSTER IGE RAST QL: ABNORMAL
DEPRECATED LONDON PLANE IGE RAST QL: NORMAL
DEPRECATED MUGWORT IGE RAST QL: NORMAL
DEPRECATED NETTLE IGE RAST QL: ABNORMAL
DEPRECATED OYSTER IGE RAST QL: ABNORMAL
DEPRECATED PECAN/HICK TREE IGE RAST QL: NORMAL
DEPRECATED PER RYE GRASS IGE RAST QL: ABNORMAL
DEPRECATED PRIVET IGE RAST QL: NORMAL
DEPRECATED RED TOP GRASS IGE RAST QL: ABNORMAL
DEPRECATED ROACH IGE RAST QL: ABNORMAL
DEPRECATED SALMON IGE RAST QL: NORMAL
DEPRECATED SALTWORT IGE RAST QL: ABNORMAL
DEPRECATED SHEEP SORREL IGE RAST QL: NORMAL
DEPRECATED SHRIMP IGE RAST QL: ABNORMAL
DEPRECATED SILVER BIRCH IGE RAST QL: NORMAL
DEPRECATED TIMOTHY IGE RAST QL: ABNORMAL
DEPRECATED TUNA IGE RAST QL: NORMAL
DEPRECATED WHITE OAK IGE RAST QL: NORMAL
DEPRECATED WILLOW IGE RAST QL: NORMAL
DOG DANDER IGE QN: 1.03 KU/L
ELDER IGE QN: 0.26 KU/L
ELM CEDAR CLASS: NORMAL
ELM CEDAR, IGE: <0.1 KU/L
ENGL PLANTAIN IGE QN: <0.1 KU/L
GUM-TREE IGE QN: <0.1 KU/L
HACKBERRY TREE IGE QN: <0.1 KU/L
JOHNSON GRASS IGE QN: 2.09 KU/L
KENT BLUE GRASS IGE QN: 2.84 KU/L
LOBSTER IGE QN: 2.75 KU/L
LONDON PLANE IGE QN: <0.1 KU/L
MUGWORT IGE QN: <0.1 KU/L
NETTLE IGE QN: 0.26 KU/L
OYSTER IGE QN: 0.42 KU/L
PECAN/HICK TREE IGE QN: <0.1 KU/L
PER RYE GRASS IGE QN: 1.63 KU/L
PRIVET IGE QN: <0.1 KU/L
RED TOP GRASS IGE QN: 1.79 KU/L
ROACH IGE QN: 1.82 KU/L
SALMON IGE QN: <0.1 KU/L
SALTWORT IGE QN: 0.14 KU/L
SHEEP SORREL IGE QN: <0.1 KU/L
SHRIMP IGE QN: 3.64 KU/L
SILVER BIRCH IGE QN: <0.1 KU/L
STEMPHYLIUM HERBARUM CLASS: NORMAL
STEMPHYLLIUM, IGE: <0.1 KU/L
STRONGYLOIDES ANTIBODY IGG: NEGATIVE
T CANIS IGG SER QL IA: NEGATIVE
TIMOTHY IGE QN: 1.68 KU/L
TUNA IGE QN: <0.1 KU/L
WHITE ASH CLASS: NORMAL
WHITE OAK IGE QN: <0.1 KU/L
WHITE PINE CLASS: NORMAL
WILLOW IGE QN: <0.1 KU/L

## 2023-09-22 LAB — C1INH FUNCTIONAL/C1INH TOTAL MFR SERPL: >100 %

## 2023-09-25 ENCOUNTER — OFFICE VISIT (OUTPATIENT)
Dept: ALLERGY | Facility: CLINIC | Age: 18
End: 2023-09-25
Payer: COMMERCIAL

## 2023-09-25 VITALS
WEIGHT: 168 LBS | HEART RATE: 85 BPM | HEIGHT: 62 IN | TEMPERATURE: 98 F | DIASTOLIC BLOOD PRESSURE: 72 MMHG | BODY MASS INDEX: 30.91 KG/M2 | SYSTOLIC BLOOD PRESSURE: 115 MMHG

## 2023-09-25 DIAGNOSIS — J30.81 ALLERGIC RHINITIS DUE TO ANIMAL (CAT) (DOG) HAIR AND DANDER: Primary | ICD-10-CM

## 2023-09-25 DIAGNOSIS — J30.89 ALLERGIC RHINITIS DUE TO AMERICAN HOUSE DUST MITE: ICD-10-CM

## 2023-09-25 DIAGNOSIS — J30.89 ALLERGIC RHINITIS DUE TO INSECT: ICD-10-CM

## 2023-09-25 DIAGNOSIS — J30.1 SEASONAL ALLERGIC RHINITIS DUE TO POLLEN: ICD-10-CM

## 2023-09-25 DIAGNOSIS — Z91.018 FOOD ALLERGY: ICD-10-CM

## 2023-09-25 LAB
A LUMBRIC IGE QN: 1.38 KU/L
C1INH SERPL-MCNC: 28 MG/DL (ref 21–39)
DEPRECATED A LUMBRIC IGE RAST QL: 2

## 2023-09-25 PROCEDURE — 1159F PR MEDICATION LIST DOCUMENTED IN MEDICAL RECORD: ICD-10-PCS | Mod: CPTII,S$GLB,, | Performed by: ALLERGY & IMMUNOLOGY

## 2023-09-25 PROCEDURE — 99214 PR OFFICE/OUTPT VISIT, EST, LEVL IV, 30-39 MIN: ICD-10-PCS | Mod: S$GLB,,, | Performed by: ALLERGY & IMMUNOLOGY

## 2023-09-25 PROCEDURE — 3074F PR MOST RECENT SYSTOLIC BLOOD PRESSURE < 130 MM HG: ICD-10-PCS | Mod: CPTII,S$GLB,, | Performed by: ALLERGY & IMMUNOLOGY

## 2023-09-25 PROCEDURE — 1159F MED LIST DOCD IN RCRD: CPT | Mod: CPTII,S$GLB,, | Performed by: ALLERGY & IMMUNOLOGY

## 2023-09-25 PROCEDURE — 3074F SYST BP LT 130 MM HG: CPT | Mod: CPTII,S$GLB,, | Performed by: ALLERGY & IMMUNOLOGY

## 2023-09-25 PROCEDURE — 99999 PR PBB SHADOW E&M-EST. PATIENT-LVL III: ICD-10-PCS | Mod: PBBFAC,,, | Performed by: ALLERGY & IMMUNOLOGY

## 2023-09-25 PROCEDURE — 99214 OFFICE O/P EST MOD 30 MIN: CPT | Mod: S$GLB,,, | Performed by: ALLERGY & IMMUNOLOGY

## 2023-09-25 PROCEDURE — 3008F PR BODY MASS INDEX (BMI) DOCUMENTED: ICD-10-PCS | Mod: CPTII,S$GLB,, | Performed by: ALLERGY & IMMUNOLOGY

## 2023-09-25 PROCEDURE — 3044F PR MOST RECENT HEMOGLOBIN A1C LEVEL <7.0%: ICD-10-PCS | Mod: CPTII,S$GLB,, | Performed by: ALLERGY & IMMUNOLOGY

## 2023-09-25 PROCEDURE — 3044F HG A1C LEVEL LT 7.0%: CPT | Mod: CPTII,S$GLB,, | Performed by: ALLERGY & IMMUNOLOGY

## 2023-09-25 PROCEDURE — 3078F PR MOST RECENT DIASTOLIC BLOOD PRESSURE < 80 MM HG: ICD-10-PCS | Mod: CPTII,S$GLB,, | Performed by: ALLERGY & IMMUNOLOGY

## 2023-09-25 PROCEDURE — 3078F DIAST BP <80 MM HG: CPT | Mod: CPTII,S$GLB,, | Performed by: ALLERGY & IMMUNOLOGY

## 2023-09-25 PROCEDURE — 3008F BODY MASS INDEX DOCD: CPT | Mod: CPTII,S$GLB,, | Performed by: ALLERGY & IMMUNOLOGY

## 2023-09-25 PROCEDURE — 99999 PR PBB SHADOW E&M-EST. PATIENT-LVL III: CPT | Mod: PBBFAC,,, | Performed by: ALLERGY & IMMUNOLOGY

## 2023-09-25 NOTE — PROGRESS NOTES
"Subjective:      Patient ID: Anita Romero is a 18 y.o. female.    Chief Complaint:  Follow-up      HPI:     9/25/2023: 18 year old female with a history of food allergies, allergic rhinitis- pollen, cat, dog, dust mites and cockroach  Eats fish without symptoms.  NO hives recently  Not required Epipen  Avoiding shellfish    She does not feel rhinitis symptoms are significant.        9/18/2023: 18 year old female- "My Doctor recommended that I get allergy testing."  Shellfish- anaphylaxis- throat swelling and lips  She avoids fish as well, as she ate gumbo and is unsure, if she had symptoms related to fish or shellfish.      Hives-intermittently for the last 2 years, hives once or twice month    She suspects certain trees cause rashes.    She works at a .  She denies tongue or throat swelling when not in contact with shellfish.  She avoids shellfish.  She denies drug allergy.  She denies insect allergy.  She denies taking herbal medications  Benadryl earlier today    Past Medical History:  See above    Family history:  No family history of autoimmune disease.     Current Outpatient Medications on File Prior to Visit   Medication Sig Dispense Refill    cetirizine (ZYRTEC) 10 MG tablet Take 1 tablet (10 mg total) by mouth once daily. (Patient not taking: Reported on 9/25/2023) 30 tablet 5    EPINEPHrine (EPIPEN) 0.3 mg/0.3 mL AtIn Inject 0.6 mLs (0.6 mg total) into the muscle once. for 1 dose 0.6 mL 0    famotidine (PEPCID) 40 MG tablet Take 1 tablet (40 mg total) by mouth once daily. (Patient not taking: Reported on 9/25/2023) 30 tablet 11    TRI-LO-SPRINTEC 0.18/0.215/0.25 mg-25 mcg tablet Take 1 tablet by mouth once daily.       No current facility-administered medications on file prior to visit.        Review of patient's allergies indicates:   Allergen Reactions    Shellfish containing products Swelling          Environmental History: Pets in the home: dogs (1).  Tobacco Smoke in Home: no  Review of " Systems   Constitutional:  Negative for chills and fever.   HENT:  Negative for congestion and rhinorrhea.    Eyes:  Negative for discharge and itching.   Respiratory:  Negative for cough, shortness of breath and wheezing.    Cardiovascular:  Negative for chest pain and leg swelling.   Gastrointestinal:  Negative for nausea and vomiting.   Skin:  Negative for rash and wound.   Allergic/Immunologic: Positive for food allergies. Negative for environmental allergies and immunocompromised state.   Neurological:  Negative for facial asymmetry and speech difficulty.   Psychiatric/Behavioral:  Negative for behavioral problems and suicidal ideas.        Objective:   Physical Exam  Vitals reviewed.   Constitutional:       General: She is not in acute distress.     Appearance: Normal appearance. She is well-developed. She is not ill-appearing, toxic-appearing or diaphoretic.   HENT:      Head: Normocephalic and atraumatic.      Right Ear: Tympanic membrane, ear canal and external ear normal. There is no impacted cerumen.      Left Ear: Tympanic membrane, ear canal and external ear normal. There is no impacted cerumen.      Nose: Nose normal. No congestion or rhinorrhea.      Mouth/Throat:      Pharynx: No oropharyngeal exudate or posterior oropharyngeal erythema.   Eyes:      General: No scleral icterus.        Right eye: No discharge.         Left eye: No discharge.      Pupils: Pupils are equal, round, and reactive to light.   Neck:      Thyroid: No thyromegaly.   Cardiovascular:      Rate and Rhythm: Normal rate and regular rhythm.      Heart sounds: Normal heart sounds. No murmur heard.     No friction rub. No gallop.   Pulmonary:      Effort: Pulmonary effort is normal. No respiratory distress.      Breath sounds: Normal breath sounds. No stridor. No wheezing, rhonchi or rales.   Chest:      Chest wall: No tenderness.   Musculoskeletal:         General: No swelling, tenderness, deformity or signs of injury. Normal range  of motion.      Cervical back: Normal range of motion and neck supple. No rigidity or tenderness. No muscular tenderness.      Right lower leg: No edema.      Left lower leg: No edema.   Lymphadenopathy:      Cervical: No cervical adenopathy.   Skin:     General: Skin is warm.      Coloration: Skin is not jaundiced or pale.      Findings: No bruising, erythema or rash.   Neurological:      General: No focal deficit present.      Mental Status: She is alert and oriented to person, place, and time.      Gait: Gait normal.   Psychiatric:         Mood and Affect: Mood normal.         Behavior: Behavior normal.         Thought Content: Thought content normal.         Judgment: Judgment normal.           Assessment:      1. Allergic rhinitis due to animal (cat) (dog) hair and dander    2. Allergic rhinitis due to insect    3. Allergic rhinitis due to [place] house dust mite    4. Seasonal allergic rhinitis due to pollen    5. Food allergy          Plan:     Allergic rhinitis due to animal (cat) (dog) hair and dander    Allergic rhinitis due to insect    Allergic rhinitis due to [place] house dust mite    Seasonal allergic rhinitis due to pollen    Food allergy    Discussed lab results today. Allergy avoidance measures discussed. Information given on AVS.  Epipen 2 pack  Recommend strict avoidance of shellfish  and shellfish products.  RTC 1 year or sooner, if needed     GIA [facility]                  Problems Address                                                 Amount and/or Complexity                                                                      Risk       3           [] 2 or more self-limited or minor problems                      [] Limited                                                                        [] Low                  [] 1 stable chronic illness                                                  Any combination of the two                                               OTC drugs                   []Acute, uncomplicated illness or injury                            Review of prior external notes from unique source           Minor surgery with no risk factors                                                                                                               [] 1 []2  []3+                                                                                                              Review of results from each unique test                                                                                                               [] 1 []2  [] 3+                                                                                                              Order of each unique test                                                                                                               [] 1 []2  [] 3+                                                                                                              Or                                                                                                             [] Assessment requiring an independent historian      4            [] One or more chronic illness with exacerbation,              [] Moderate                                                                      [x] Moderate                 Progression, or side effects of treatment                            -test documents or independent historians                        Prescription drug management                [x]  2 or more stable chronic illnesses                                    [] Independent interpretation of tests                              Minor surgery with identifiable risk                [] 1 undiagnosed new problem with uncertain prognosis    [x] Discussion or management of test results                    elective major surgery                [] 1 acute illness with                systemic symptoms                                                                                                                                                               [] 1 acute complicated injury                                                                                                                                          Elective major surgery                                                                                                                                                                                                                                                                                                                                                                                                  5            [] 1 or more chronic illnesses with severe exacerbation,     [] Extensive(two from below)                                         [] High                                                                                                               [] Independent interpretation of results                         Drug therapy requiring intensive                                                                                                               []Discussion of management or test interpretation           monitoring                                                                                                                                                                                                       Decision to de-escalate care                 [] 1 acute or chronic illness or injury that poses a threat                                                                                               Decision regarding hospitalization

## 2023-10-02 LAB
CU INDEX: 14
CU, ANTI-THYROGLOBULIN IGG: ABNORMAL
CU, ANTI-THYROID PEROXIDASE IGG: 50 IU/ML
CU, TSH (THYROTROPIN): 2.2 UIU/ML (ref 0.4–4)

## 2023-11-16 ENCOUNTER — OFFICE VISIT (OUTPATIENT)
Dept: INTERNAL MEDICINE | Facility: CLINIC | Age: 18
End: 2023-11-16
Payer: COMMERCIAL

## 2023-11-16 DIAGNOSIS — R30.0 DYSURIA: Primary | ICD-10-CM

## 2023-11-16 PROCEDURE — 99213 PR OFFICE/OUTPT VISIT, EST, LEVL III, 20-29 MIN: ICD-10-PCS | Mod: 95,,, | Performed by: NURSE PRACTITIONER

## 2023-11-16 PROCEDURE — 3044F HG A1C LEVEL LT 7.0%: CPT | Mod: CPTII,95,, | Performed by: NURSE PRACTITIONER

## 2023-11-16 PROCEDURE — 1160F RVW MEDS BY RX/DR IN RCRD: CPT | Mod: CPTII,95,, | Performed by: NURSE PRACTITIONER

## 2023-11-16 PROCEDURE — 99213 OFFICE O/P EST LOW 20 MIN: CPT | Mod: 95,,, | Performed by: NURSE PRACTITIONER

## 2023-11-16 PROCEDURE — 1160F PR REVIEW ALL MEDS BY PRESCRIBER/CLIN PHARMACIST DOCUMENTED: ICD-10-PCS | Mod: CPTII,95,, | Performed by: NURSE PRACTITIONER

## 2023-11-16 PROCEDURE — 3044F PR MOST RECENT HEMOGLOBIN A1C LEVEL <7.0%: ICD-10-PCS | Mod: CPTII,95,, | Performed by: NURSE PRACTITIONER

## 2023-11-16 PROCEDURE — 1159F MED LIST DOCD IN RCRD: CPT | Mod: CPTII,95,, | Performed by: NURSE PRACTITIONER

## 2023-11-16 PROCEDURE — 1159F PR MEDICATION LIST DOCUMENTED IN MEDICAL RECORD: ICD-10-PCS | Mod: CPTII,95,, | Performed by: NURSE PRACTITIONER

## 2023-11-16 RX ORDER — SULFAMETHOXAZOLE AND TRIMETHOPRIM 800; 160 MG/1; MG/1
1 TABLET ORAL 2 TIMES DAILY
Qty: 10 TABLET | Refills: 0 | Status: SHIPPED | OUTPATIENT
Start: 2023-11-16

## 2023-11-16 NOTE — PROGRESS NOTES
Subjective     Patient ID: Anita Romero is a 18 y.o. female.    Chief Complaint: No chief complaint on file.    The patient location is: [place]  The chief complaint leading to consultation is: UTI     Visit type: audiovisual    Face to Face time with patient: 10 minutes of total time spent on the encounter, which includes face to face time and non-face to face time preparing to see the patient (eg, review of tests), Obtaining and/or reviewing separately obtained history, Documenting clinical information in the electronic or other health record, Independently interpreting results (not separately reported) and communicating results to the patient/family/caregiver, or Care coordination (not separately reported).         Each patient to whom he or she provides medical services by telemedicine is:  (1) informed of the relationship between the physician and patient and the respective role of any other health care provider with respect to management of the patient; and (2) notified that he or she may decline to receive medical services by telemedicine and may withdraw from such care at any time.    Notes:        Patient presents with UTI symptoms.   Reports symptoms are recurrent.  Last episode about 2 months.     Dysuria   This is a recurrent problem. The current episode started in the past 7 days. The problem occurs every urination. The problem has been gradually worsening. The quality of the pain is described as burning. The pain is at a severity of 3/10. The pain is mild. There has been no fever. She is Sexually active. There is No history of pyelonephritis. Pertinent negatives include no behavior changes, chills, discharge, flank pain, frequency, hematuria, hesitancy, nausea, possible pregnancy, sweats, urgency, vomiting, weight loss, constipation, rash or withholding. She has tried nothing (AZO tabs) for the symptoms. The treatment provided no relief. Her past medical history is significant for recurrent UTIs. There is  no history of catheterization, diabetes insipidus, diabetes mellitus, genitourinary reflux, kidney stones, a single kidney, STD, urinary stasis or a urological procedure.     Review of Systems   Constitutional:  Negative for chills and weight loss.   Gastrointestinal:  Negative for constipation, nausea and vomiting.   Genitourinary:  Positive for dysuria. Negative for flank pain, frequency, hematuria, hesitancy and urgency.   Integumentary:  Negative for rash.          Objective     Physical Exam  Pulmonary:      Effort: Pulmonary effort is normal. No respiratory distress.   Neurological:      General: No focal deficit present.      Mental Status: She is alert.   Psychiatric:         Behavior: Behavior is cooperative.            Assessment and Plan     1. Dysuria    Other orders  -     sulfamethoxazole-trimethoprim 800-160mg (BACTRIM DS) 800-160 mg Tab; Take 1 tablet by mouth 2 (two) times daily.  Dispense: 10 tablet; Refill: 0        Advised to increase water intake.  Advised to take antibiotics until they are completely gone.  If no improvement in symptoms, advised to follow up in clinic.       No follow-ups on file.

## 2024-07-25 ENCOUNTER — OFFICE VISIT (OUTPATIENT)
Dept: URGENT CARE | Facility: CLINIC | Age: 19
End: 2024-07-25
Payer: COMMERCIAL

## 2024-07-25 VITALS
WEIGHT: 174.19 LBS | RESPIRATION RATE: 17 BRPM | OXYGEN SATURATION: 98 % | BODY MASS INDEX: 32.05 KG/M2 | DIASTOLIC BLOOD PRESSURE: 73 MMHG | HEART RATE: 97 BPM | SYSTOLIC BLOOD PRESSURE: 134 MMHG | HEIGHT: 62 IN | TEMPERATURE: 99 F

## 2024-07-25 DIAGNOSIS — R10.9 ABDOMINAL CRAMPING: Primary | ICD-10-CM

## 2024-07-25 DIAGNOSIS — Z11.3 SCREENING EXAMINATION FOR STD (SEXUALLY TRANSMITTED DISEASE): ICD-10-CM

## 2024-07-25 PROCEDURE — 87491 CHLMYD TRACH DNA AMP PROBE: CPT | Performed by: NURSE PRACTITIONER

## 2024-07-25 PROCEDURE — 99213 OFFICE O/P EST LOW 20 MIN: CPT | Mod: S$GLB,,, | Performed by: NURSE PRACTITIONER

## 2024-07-25 PROCEDURE — 87591 N.GONORRHOEAE DNA AMP PROB: CPT | Performed by: NURSE PRACTITIONER

## 2024-07-25 RX ORDER — NAPROXEN 500 MG/1
500 TABLET ORAL 2 TIMES DAILY
Qty: 18 TABLET | Refills: 0 | Status: SHIPPED | OUTPATIENT
Start: 2024-07-25

## 2024-07-26 NOTE — PATIENT INSTRUCTIONS

## 2024-07-26 NOTE — PROGRESS NOTES
"Subjective:      Patient ID: Anita Romero is a 19 y.o. female.    Vitals:  height is 5' 2" (1.575 m) and weight is 79 kg (174 lb 2.6 oz). Her oral temperature is 98.7 °F (37.1 °C). Her blood pressure is 134/73 and her pulse is 97. Her respiration is 17 and oxygen saturation is 98%.     Chief Complaint: Abdominal Pain    19 yr old female presents to the Urgent Care with complaint of abdominal cramping today. Menstrual cycle began last night. Patient also requesting STD screening.     Abdominal Cramping  This is a new problem. The current episode started today. The onset quality is sudden. The problem occurs constantly. The problem has been unchanged. Pain location: suprapubic region. The pain is at a severity of 6/10. The pain is mild. The quality of the pain is cramping. The abdominal pain does not radiate. Pertinent negatives include no anorexia, arthralgias, belching, constipation, diarrhea, dysuria, fever, flatus, frequency, headaches, hematochezia, hematuria, melena, myalgias, nausea, vomiting or weight loss. Nothing aggravates the pain. The pain is relieved by Nothing. She has tried nothing for the symptoms.       Constitution: Negative for chills, sweating, fatigue and fever.   Gastrointestinal:  Negative for nausea, vomiting, constipation, diarrhea and bright red blood in stool.   Genitourinary:  Negative for dysuria, frequency, urgency, urine decreased, flank pain and hematuria.   Musculoskeletal:  Negative for joint pain and muscle ache.   Neurological:  Negative for headaches.      Objective:     Physical Exam   Constitutional: She is oriented to person, place, and time. She appears well-developed. She is cooperative. She is easily aroused.  Non-toxic appearance. She does not appear ill. No distress.   HENT:   Head: Normocephalic and atraumatic.   Nose: Nose abnormal.   Mouth/Throat: Mucous membranes are normal.   Eyes: Conjunctivae and lids are normal.   Cardiovascular: Normal rate, regular rhythm and " normal heart sounds.   Pulmonary/Chest: Effort normal and breath sounds normal. No respiratory distress.   Abdominal: Normal appearance and bowel sounds are normal. She exhibits no distension, no abdominal bruit, no pulsatile midline mass and no mass. Soft. There is abdominal tenderness (mild) in the suprapubic area. There is no rigidity, no rebound, no guarding, no tenderness at McBurney's point, negative Bernstein's sign, no left CVA tenderness and no right CVA tenderness.   Genitourinary:         Comments: Deferred     Musculoskeletal: Normal range of motion.         General: No edema. Normal range of motion.   Neurological: She is alert, oriented to person, place, and time and easily aroused. She has normal strength.   Skin: Skin is warm, dry, intact, not diaphoretic and not pale.   Psychiatric: Her speech is normal and behavior is normal. Judgment and thought content normal.   Nursing note and vitals reviewed.      Assessment:     1. Abdominal cramping    2. Screening examination for STD (sexually transmitted disease)        Plan:     Presentation most consistent with Dysmenorrhea. Naproxen rx for abdominal cramping. Advised to f/u with PCP or gynecology for further evaluation.     Abdominal cramping  -     POCT urine pregnancy  -     naproxen (NAPROSYN) 500 MG tablet; Take 1 tablet (500 mg total) by mouth 2 (two) times daily.  Dispense: 18 tablet; Refill: 0    Screening examination for STD (sexually transmitted disease)  -     C. trachomatis/N. gonorrhoeae by AMP DNA [facility]; Urine      Patient Instructions   If you were prescribed a narcotic or controlled medication, do not drive or operate heavy equipment or machinery while taking these medications.  You must understand that you've received an Urgent Care treatment only and that you may be released before all your medical problems are known or treated. You, the patient, will arrange for follow up care as instructed.  Follow up with your PCP or specialty clinic  as directed within 2-5 days if not improved or as needed.  You can call [phone] to schedule an appointment with the appropriate provider.  If your condition worsens we recommend that you receive another evaluation at the emergency room immediately or contact your primary medical clinics after hours call service to discuss your concerns.  Please return here or go to the Emergency Department for any concerns or worsening of condition.

## 2024-07-30 LAB
C TRACH DNA SPEC QL NAA+PROBE: NOT DETECTED
N GONORRHOEA DNA SPEC QL NAA+PROBE: NOT DETECTED

## 2024-09-05 NOTE — PATIENT INSTRUCTIONS
Dust mites are microscopic insect-like pests that live in house dust. They feed on dead skin or dander that are shed by people and pets.They can worsen asthma and allergies. Dust mites live in mattresses, bedding, upholstered furniture, carpets, and curtains in your home. No matter how clean a home is, dust mites cannot be completely eliminated. A number of things can be done to reduce the number of dust mites:  -Use a dehumidifier or air conditioner to maintain humidity levels at, or below, 50 percent.  -Encase mattress and pillows in dust mite- proof or allergen impermeable covers.  -Wash all bedding and blankets once a week in hot water, 130 to 140 degrees Fahrenheit, to kill dust mites. Non- washable bedding can be frozen overnight.  -Replace wool or feathered bedding products with synthetic materials, and traditional stuffed animals with washable ones.  -In bedrooms, replace wall to wall carpeting with bare floors, and remove fabric curtains and upholstered furniture, whenever possible.  -Use a damp mop or rag to remove dust. Never use a dry cloth, as it stirs up allergens.  -Use a double-layered microfilter bag or a HEPA filter in your vacuum .  -Wear a mask while vacuuming, and stay out of the vacuuming area for 20 minutes after vacuuming, to allow dust and allergens to settle.       Hepa air filter in your bedroom.  Pollen is the highest early morning. Avoid early morning activities, if possible.  Keep windows up and cars clean during pollen season.  Remove all clothing and shower at the end of the day to remove pollen.     No

## 2024-09-24 ENCOUNTER — PATIENT MESSAGE (OUTPATIENT)
Dept: INTERNAL MEDICINE | Facility: CLINIC | Age: 19
End: 2024-09-24
Payer: COMMERCIAL